# Patient Record
Sex: FEMALE | Race: WHITE | Employment: OTHER | URBAN - NONMETROPOLITAN AREA
[De-identification: names, ages, dates, MRNs, and addresses within clinical notes are randomized per-mention and may not be internally consistent; named-entity substitution may affect disease eponyms.]

---

## 2017-12-20 ENCOUNTER — DOCTOR'S OFFICE (OUTPATIENT)
Dept: URBAN - NONMETROPOLITAN AREA CLINIC 1 | Facility: CLINIC | Age: 72
Setting detail: OPHTHALMOLOGY
End: 2017-12-20
Payer: COMMERCIAL

## 2017-12-20 ENCOUNTER — RX ONLY (RX ONLY)
Age: 72
End: 2017-12-20

## 2017-12-20 DIAGNOSIS — H04.123: ICD-10-CM

## 2017-12-20 DIAGNOSIS — H35.033: ICD-10-CM

## 2017-12-20 DIAGNOSIS — Z96.1: ICD-10-CM

## 2017-12-20 DIAGNOSIS — H40.003: ICD-10-CM

## 2017-12-20 DIAGNOSIS — H04.121: ICD-10-CM

## 2017-12-20 DIAGNOSIS — H04.122: ICD-10-CM

## 2017-12-20 PROCEDURE — 92250 FUNDUS PHOTOGRAPHY W/I&R: CPT | Performed by: OPHTHALMOLOGY

## 2017-12-20 PROCEDURE — 92014 COMPRE OPH EXAM EST PT 1/>: CPT | Performed by: OPHTHALMOLOGY

## 2017-12-20 PROCEDURE — 83861 MICROFLUID ANALY TEARS: CPT | Performed by: OPHTHALMOLOGY

## 2017-12-20 ASSESSMENT — REFRACTION_MANIFEST
OD_VA1: 20/25-2
OU_VA: 20/
OD_VA2: 20/
OD_VA1: 20/
OU_VA: 20/
OD_VA2: 20/25-2
OS_ADD: +2.25
OS_VA1: 20/
OS_VA2: 20/
OS_VA1: 20/25-2
OS_VA3: 20/
OD_VA3: 20/
OS_VA2: 20/25-2
OD_VA3: 20/
OS_SPHERE: +0.75
OS_CYLINDER: -0.50
OS_VA3: 20/
OD_VA2: 20/
OD_ADD: +2.25
OS_AXIS: 090
OS_VA2: 20/
OD_VA1: 20/
OD_AXIS: 110
OU_VA: 20/
OS_VA3: 20/
OD_CYLINDER: -0.25
OD_SPHERE: +0.75
OD_VA3: 20/
OS_VA1: 20/

## 2017-12-20 ASSESSMENT — DRY EYES - PHYSICIAN NOTES
OD_GENERALCOMMENTS: K SICCA
OS_GENERALCOMMENTS: K SICCA

## 2017-12-20 ASSESSMENT — REFRACTION_CURRENTRX
OS_OVR_VA: 20/
OS_OVR_VA: 20/
OS_SPHERE: +2.50
OS_OVR_VA: 20/
OD_CYLINDER: SPH
OD_SPHERE: +2.50
OS_AXIS: 125
OD_ADD: +2.25
OS_ADD: +2.25
OD_OVR_VA: 20/
OD_VPRISM_DIRECTION: PROGS
OD_OVR_VA: 20/
OS_CYLINDER: -0.25
OS_SPHERE: +0.50
OS_VPRISM_DIRECTION: PROGS
OD_SPHERE: +0.25
OD_OVR_VA: 20/

## 2017-12-20 ASSESSMENT — REFRACTION_AUTOREFRACTION
OS_CYLINDER: -0.50
OD_AXIS: 115
OS_AXIS: 080
OS_SPHERE: +1.00
OD_SPHERE: +1.25
OD_CYLINDER: -0.50

## 2017-12-20 ASSESSMENT — CONFRONTATIONAL VISUAL FIELD TEST (CVF)
OD_FINDINGS: FULL
OS_FINDINGS: FULL

## 2017-12-20 ASSESSMENT — SPHEQUIV_DERIVED
OD_SPHEQUIV: 0.625
OS_SPHEQUIV: 0.5
OS_SPHEQUIV: 0.75
OD_SPHEQUIV: 1

## 2017-12-20 ASSESSMENT — VISUAL ACUITY
OD_BCVA: 20/25-1
OS_BCVA: 20/25

## 2018-08-01 ENCOUNTER — DOCTOR'S OFFICE (OUTPATIENT)
Dept: URBAN - NONMETROPOLITAN AREA CLINIC 1 | Facility: CLINIC | Age: 73
Setting detail: OPHTHALMOLOGY
End: 2018-08-01
Payer: COMMERCIAL

## 2018-08-01 ENCOUNTER — RX ONLY (RX ONLY)
Age: 73
End: 2018-08-01

## 2018-08-01 DIAGNOSIS — Z96.1: ICD-10-CM

## 2018-08-01 DIAGNOSIS — H35.033: ICD-10-CM

## 2018-08-01 DIAGNOSIS — H04.121: ICD-10-CM

## 2018-08-01 DIAGNOSIS — H04.122: ICD-10-CM

## 2018-08-01 DIAGNOSIS — H40.013: ICD-10-CM

## 2018-08-01 DIAGNOSIS — H04.123: ICD-10-CM

## 2018-08-01 PROCEDURE — 83861 MICROFLUID ANALY TEARS: CPT | Performed by: OPHTHALMOLOGY

## 2018-08-01 PROCEDURE — 92014 COMPRE OPH EXAM EST PT 1/>: CPT | Performed by: OPHTHALMOLOGY

## 2018-08-01 PROCEDURE — 76514 ECHO EXAM OF EYE THICKNESS: CPT | Performed by: OPHTHALMOLOGY

## 2018-08-01 PROCEDURE — 92133 CPTRZD OPH DX IMG PST SGM ON: CPT | Performed by: OPHTHALMOLOGY

## 2018-08-01 ASSESSMENT — REFRACTION_CURRENTRX
OD_ADD: +2.25
OS_CYLINDER: -0.25
OD_SPHERE: +0.25
OD_OVR_VA: 20/
OS_VPRISM_DIRECTION: PROGS
OS_OVR_VA: 20/
OS_ADD: +2.25
OS_AXIS: 125
OS_SPHERE: +0.50
OS_OVR_VA: 20/
OD_SPHERE: +2.50
OD_OVR_VA: 20/
OS_OVR_VA: 20/
OD_VPRISM_DIRECTION: PROGS
OD_OVR_VA: 20/
OS_SPHERE: +2.50
OD_CYLINDER: SPH

## 2018-08-01 ASSESSMENT — REFRACTION_MANIFEST
OS_VA1: 20/
OS_VA1: 20/25-2
OS_VA2: 20/
OD_VA2: 20/
OD_VA3: 20/
OS_VA3: 20/
OS_AXIS: 090
OD_CYLINDER: -0.25
OU_VA: 20/
OD_ADD: +2.25
OS_CYLINDER: -0.50
OS_VA2: 20/25-2
OS_ADD: +2.25
OS_VA3: 20/
OD_VA3: 20/
OS_VA1: 20/
OS_VA3: 20/
OD_VA2: 20/25-2
OD_VA1: 20/
OD_AXIS: 110
OS_SPHERE: +0.75
OD_VA1: 20/25-2
OS_VA2: 20/
OD_VA1: 20/
OD_VA3: 20/
OD_VA2: 20/
OU_VA: 20/
OD_SPHERE: +0.75
OU_VA: 20/

## 2018-08-01 ASSESSMENT — PACHYMETRY
OD_CT_UM: 553
OD_CT_CORRECTION: -1
OS_CT_CORRECTION: -1
OS_CT_UM: 555

## 2018-08-01 ASSESSMENT — VISUAL ACUITY
OD_BCVA: 20/25-2
OS_BCVA: 20/25-1

## 2018-08-01 ASSESSMENT — REFRACTION_AUTOREFRACTION
OD_SPHERE: +1.25
OD_AXIS: 115
OS_AXIS: 080
OD_CYLINDER: -0.50
OS_SPHERE: +1.00
OS_CYLINDER: -0.50

## 2018-08-01 ASSESSMENT — SPHEQUIV_DERIVED
OD_SPHEQUIV: 1
OS_SPHEQUIV: 0.75
OS_SPHEQUIV: 0.5
OD_SPHEQUIV: 0.625

## 2018-08-01 ASSESSMENT — DRY EYES - PHYSICIAN NOTES
OS_GENERALCOMMENTS: K SICCA
OD_GENERALCOMMENTS: K SICCA

## 2018-08-01 ASSESSMENT — CONFRONTATIONAL VISUAL FIELD TEST (CVF)
OD_FINDINGS: FULL
OS_FINDINGS: FULL

## 2019-02-05 ENCOUNTER — DOCTOR'S OFFICE (OUTPATIENT)
Dept: URBAN - NONMETROPOLITAN AREA CLINIC 1 | Facility: CLINIC | Age: 74
Setting detail: OPHTHALMOLOGY
End: 2019-02-05
Payer: COMMERCIAL

## 2019-02-05 DIAGNOSIS — Z96.1: ICD-10-CM

## 2019-02-05 DIAGNOSIS — H04.121: ICD-10-CM

## 2019-02-05 DIAGNOSIS — H40.013: ICD-10-CM

## 2019-02-05 DIAGNOSIS — H35.033: ICD-10-CM

## 2019-02-05 DIAGNOSIS — H04.122: ICD-10-CM

## 2019-02-05 PROCEDURE — 92014 COMPRE OPH EXAM EST PT 1/>: CPT | Performed by: OPHTHALMOLOGY

## 2019-02-05 PROCEDURE — 92083 EXTENDED VISUAL FIELD XM: CPT | Performed by: OPHTHALMOLOGY

## 2019-02-05 ASSESSMENT — REFRACTION_CURRENTRX
OS_CYLINDER: -0.25
OD_ADD: +2.25
OS_AXIS: 125
OS_OVR_VA: 20/
OD_OVR_VA: 20/
OS_SPHERE: +0.50
OS_OVR_VA: 20/
OD_SPHERE: +2.50
OD_OVR_VA: 20/
OD_CYLINDER: SPH
OS_ADD: +2.25
OS_OVR_VA: 20/
OS_SPHERE: +2.50
OD_OVR_VA: 20/
OD_SPHERE: +0.25

## 2019-02-05 ASSESSMENT — REFRACTION_MANIFEST
OD_AXIS: 110
OS_ADD: +2.25
OS_VA3: 20/
OS_VA3: 20/
OS_VA1: 20/25-2
OS_AXIS: 090
OS_VA2: 20/25-2
OD_VA3: 20/
OS_VA2: 20/
OU_VA: 20/
OS_CYLINDER: -0.50
OD_ADD: +2.25
OD_VA2: 20/
OD_VA1: 20/
OD_SPHERE: +0.75
OD_CYLINDER: -0.25
OD_VA3: 20/
OS_SPHERE: +0.75
OD_VA1: 20/25-2
OD_VA2: 20/25-2
OU_VA: 20/
OS_VA1: 20/

## 2019-02-05 ASSESSMENT — VISUAL ACUITY
OS_BCVA: 20/25-1
OD_BCVA: 20/25-1

## 2019-02-05 ASSESSMENT — SPHEQUIV_DERIVED
OD_SPHEQUIV: 0.625
OS_SPHEQUIV: 0.5
OD_SPHEQUIV: 0.75
OS_SPHEQUIV: 0.75

## 2019-02-05 ASSESSMENT — DRY EYES - PHYSICIAN NOTES
OS_GENERALCOMMENTS: K SICCA
OD_GENERALCOMMENTS: K SICCA

## 2019-02-05 ASSESSMENT — REFRACTION_AUTOREFRACTION
OS_AXIS: 180
OD_AXIS: 100
OS_CYLINDER: 0.00
OD_CYLINDER: -1.00
OD_SPHERE: +1.25
OS_SPHERE: +0.75

## 2019-02-05 ASSESSMENT — CONFRONTATIONAL VISUAL FIELD TEST (CVF)
OS_FINDINGS: FULL
OD_FINDINGS: FULL

## 2019-08-13 ENCOUNTER — DOCTOR'S OFFICE (OUTPATIENT)
Dept: URBAN - NONMETROPOLITAN AREA CLINIC 1 | Facility: CLINIC | Age: 74
Setting detail: OPHTHALMOLOGY
End: 2019-08-13
Payer: COMMERCIAL

## 2019-08-13 DIAGNOSIS — H04.122: ICD-10-CM

## 2019-08-13 DIAGNOSIS — H04.121: ICD-10-CM

## 2019-08-13 DIAGNOSIS — H40.013: ICD-10-CM

## 2019-08-13 DIAGNOSIS — Z96.1: ICD-10-CM

## 2019-08-13 DIAGNOSIS — H04.123: ICD-10-CM

## 2019-08-13 DIAGNOSIS — H35.033: ICD-10-CM

## 2019-08-13 PROCEDURE — 83861 MICROFLUID ANALY TEARS: CPT | Performed by: OPHTHALMOLOGY

## 2019-08-13 PROCEDURE — 76514 ECHO EXAM OF EYE THICKNESS: CPT | Performed by: OPHTHALMOLOGY

## 2019-08-13 PROCEDURE — 92133 CPTRZD OPH DX IMG PST SGM ON: CPT | Performed by: OPHTHALMOLOGY

## 2019-08-13 PROCEDURE — 99215 OFFICE O/P EST HI 40 MIN: CPT | Performed by: OPHTHALMOLOGY

## 2019-08-13 ASSESSMENT — SPHEQUIV_DERIVED
OS_SPHEQUIV: 0.75
OD_SPHEQUIV: 0.625
OD_SPHEQUIV: 1.125
OS_SPHEQUIV: 0.5

## 2019-08-13 ASSESSMENT — REFRACTION_MANIFEST
OD_VA2: 20/25-2
OS_VA2: 20/25-2
OD_ADD: +2.25
OU_VA: 20/
OS_VA1: 20/25-2
OS_CYLINDER: -0.50
OD_VA3: 20/
OD_VA2: 20/
OS_VA1: 20/
OU_VA: 20/
OD_AXIS: 110
OS_VA3: 20/
OS_ADD: +2.25
OS_VA3: 20/
OS_SPHERE: +0.75
OS_AXIS: 090
OD_CYLINDER: -0.25
OS_VA2: 20/
OD_VA3: 20/
OD_SPHERE: +0.75
OD_VA1: 20/
OD_VA1: 20/25-2

## 2019-08-13 ASSESSMENT — PACHYMETRY
OD_CT_CORRECTION: -1
OD_CT_UM: 553
OS_CT_CORRECTION: -1
OS_CT_UM: 555

## 2019-08-13 ASSESSMENT — REFRACTION_CURRENTRX
OD_OVR_VA: 20/
OS_SPHERE: +2.50
OD_ADD: +2.25
OS_AXIS: 125
OS_CYLINDER: -0.25
OS_SPHERE: +0.50
OD_OVR_VA: 20/
OD_OVR_VA: 20/
OD_SPHERE: +0.25
OS_ADD: +2.25
OS_OVR_VA: 20/
OD_CYLINDER: SPH
OS_OVR_VA: 20/
OS_OVR_VA: 20/
OD_SPHERE: +2.50

## 2019-08-13 ASSESSMENT — REFRACTION_AUTOREFRACTION
OD_CYLINDER: -1.25
OS_AXIS: 158
OD_SPHERE: +1.75
OS_CYLINDER: -1.00
OS_SPHERE: +1.25
OD_AXIS: 111

## 2019-08-13 ASSESSMENT — DRY EYES - PHYSICIAN NOTES
OS_GENERALCOMMENTS: K SICCA
OD_GENERALCOMMENTS: K SICCA

## 2019-08-13 ASSESSMENT — CONFRONTATIONAL VISUAL FIELD TEST (CVF)
OD_FINDINGS: FULL
OS_FINDINGS: FULL

## 2019-08-13 ASSESSMENT — VISUAL ACUITY
OS_BCVA: 20/25-2
OD_BCVA: 20/30-2

## 2020-02-18 ENCOUNTER — RX ONLY (RX ONLY)
Age: 75
End: 2020-02-18

## 2020-02-18 ENCOUNTER — DOCTOR'S OFFICE (OUTPATIENT)
Dept: URBAN - NONMETROPOLITAN AREA CLINIC 1 | Facility: CLINIC | Age: 75
Setting detail: OPHTHALMOLOGY
End: 2020-02-18
Payer: COMMERCIAL

## 2020-02-18 DIAGNOSIS — H04.123: ICD-10-CM

## 2020-02-18 DIAGNOSIS — H40.013: ICD-10-CM

## 2020-02-18 DIAGNOSIS — Z96.1: ICD-10-CM

## 2020-02-18 DIAGNOSIS — H35.033: ICD-10-CM

## 2020-02-18 PROCEDURE — 92083 EXTENDED VISUAL FIELD XM: CPT | Performed by: OPHTHALMOLOGY

## 2020-02-18 PROCEDURE — 92134 CPTRZ OPH DX IMG PST SGM RTA: CPT | Performed by: OPHTHALMOLOGY

## 2020-02-18 PROCEDURE — 99214 OFFICE O/P EST MOD 30 MIN: CPT | Performed by: OPHTHALMOLOGY

## 2020-02-18 ASSESSMENT — REFRACTION_CURRENTRX
OD_SPHERE: +0.25
OS_OVR_VA: 20/
OS_CYLINDER: -0.25
OD_OVR_VA: 20/
OS_AXIS: 125
OD_OVR_VA: 20/
OS_ADD: +2.25
OS_SPHERE: +2.50
OD_ADD: +2.25
OD_SPHERE: +2.50
OS_OVR_VA: 20/
OS_SPHERE: +0.50
OD_CYLINDER: SPH

## 2020-02-18 ASSESSMENT — REFRACTION_MANIFEST
OS_AXIS: 090
OS_SPHERE: +0.75
OD_VA2: 20/25-2
OS_CYLINDER: -0.50
OD_AXIS: 110
OS_VA2: 20/25-2
OD_SPHERE: +0.75
OS_ADD: +2.25
OD_ADD: +2.25
OD_VA1: 20/25-2
OS_VA1: 20/25-2
OD_CYLINDER: -0.25

## 2020-02-18 ASSESSMENT — SPHEQUIV_DERIVED
OD_SPHEQUIV: 0.625
OS_SPHEQUIV: 0.5
OS_SPHEQUIV: 0.375
OD_SPHEQUIV: 1

## 2020-02-18 ASSESSMENT — CONFRONTATIONAL VISUAL FIELD TEST (CVF)
OS_FINDINGS: FULL
OD_FINDINGS: FULL

## 2020-02-18 ASSESSMENT — REFRACTION_AUTOREFRACTION
OS_CYLINDER: -0.75
OD_AXIS: 107
OD_SPHERE: +1.50
OD_CYLINDER: -1.00
OS_SPHERE: +0.75
OS_AXIS: 099

## 2020-02-18 ASSESSMENT — VISUAL ACUITY
OD_BCVA: 20/30-2
OS_BCVA: 20/25-2

## 2020-02-18 ASSESSMENT — DRY EYES - PHYSICIAN NOTES
OS_GENERALCOMMENTS: K SICCA
OD_GENERALCOMMENTS: K SICCA

## 2020-05-27 ENCOUNTER — DOCTOR'S OFFICE (OUTPATIENT)
Dept: URBAN - NONMETROPOLITAN AREA CLINIC 1 | Facility: CLINIC | Age: 75
Setting detail: OPHTHALMOLOGY
End: 2020-05-27
Payer: COMMERCIAL

## 2020-05-27 ENCOUNTER — RX ONLY (RX ONLY)
Age: 75
End: 2020-05-27

## 2020-05-27 DIAGNOSIS — H40.1130: ICD-10-CM

## 2020-05-27 DIAGNOSIS — H35.033: ICD-10-CM

## 2020-05-27 DIAGNOSIS — H04.123: ICD-10-CM

## 2020-05-27 DIAGNOSIS — Z96.1: ICD-10-CM

## 2020-05-27 PROCEDURE — 99214 OFFICE O/P EST MOD 30 MIN: CPT | Performed by: OPHTHALMOLOGY

## 2020-05-27 PROCEDURE — 92250 FUNDUS PHOTOGRAPHY W/I&R: CPT | Performed by: OPHTHALMOLOGY

## 2020-05-27 PROCEDURE — 92133 CPTRZD OPH DX IMG PST SGM ON: CPT | Performed by: OPHTHALMOLOGY

## 2020-05-27 ASSESSMENT — REFRACTION_CURRENTRX
OD_SPHERE: +0.25
OD_OVR_VA: 20/
OS_CYLINDER: -0.25
OS_OVR_VA: 20/
OS_OVR_VA: 20/
OD_SPHERE: +2.50
OS_SPHERE: +0.50
OS_ADD: +2.25
OS_SPHERE: +2.50
OD_OVR_VA: 20/
OS_AXIS: 125
OD_ADD: +2.25
OD_CYLINDER: SPH

## 2020-05-27 ASSESSMENT — REFRACTION_AUTOREFRACTION
OD_SPHERE: +1.50
OD_CYLINDER: -1.00
OS_CYLINDER: -0.75
OS_SPHERE: +0.75
OD_AXIS: 107
OS_AXIS: 099

## 2020-05-27 ASSESSMENT — REFRACTION_MANIFEST
OD_VA2: 20/25-2
OS_AXIS: 090
OS_CYLINDER: -0.50
OS_VA2: 20/25-2
OD_AXIS: 110
OD_ADD: +2.25
OS_SPHERE: +0.75
OD_SPHERE: +0.75
OD_CYLINDER: -0.25
OD_VA1: 20/25-2
OS_VA1: 20/25-2
OS_ADD: +2.25

## 2020-05-27 ASSESSMENT — CONFRONTATIONAL VISUAL FIELD TEST (CVF)
OS_FINDINGS: FULL
OD_FINDINGS: FULL

## 2020-05-27 ASSESSMENT — VISUAL ACUITY
OD_BCVA: 20/25-2
OS_BCVA: 20/25-1

## 2020-05-27 ASSESSMENT — SPHEQUIV_DERIVED
OS_SPHEQUIV: 0.375
OS_SPHEQUIV: 0.5
OD_SPHEQUIV: 1
OD_SPHEQUIV: 0.625

## 2020-05-27 ASSESSMENT — DRY EYES - PHYSICIAN NOTES
OD_GENERALCOMMENTS: K SICCA
OS_GENERALCOMMENTS: K SICCA

## 2020-12-01 ENCOUNTER — RX ONLY (RX ONLY)
Age: 75
End: 2020-12-01

## 2020-12-01 ENCOUNTER — DOCTOR'S OFFICE (OUTPATIENT)
Dept: URBAN - NONMETROPOLITAN AREA CLINIC 1 | Facility: CLINIC | Age: 75
Setting detail: OPHTHALMOLOGY
End: 2020-12-01
Payer: COMMERCIAL

## 2020-12-01 VITALS — HEIGHT: 55 IN

## 2020-12-01 DIAGNOSIS — H35.373: ICD-10-CM

## 2020-12-01 DIAGNOSIS — Z96.1: ICD-10-CM

## 2020-12-01 DIAGNOSIS — H04.122: ICD-10-CM

## 2020-12-01 DIAGNOSIS — H04.121: ICD-10-CM

## 2020-12-01 DIAGNOSIS — H35.033: ICD-10-CM

## 2020-12-01 DIAGNOSIS — H40.1130: ICD-10-CM

## 2020-12-01 PROCEDURE — 92134 CPTRZ OPH DX IMG PST SGM RTA: CPT | Performed by: OPHTHALMOLOGY

## 2020-12-01 PROCEDURE — 92083 EXTENDED VISUAL FIELD XM: CPT | Performed by: OPHTHALMOLOGY

## 2020-12-01 PROCEDURE — 92014 COMPRE OPH EXAM EST PT 1/>: CPT | Performed by: OPHTHALMOLOGY

## 2020-12-01 PROCEDURE — 76514 ECHO EXAM OF EYE THICKNESS: CPT | Performed by: OPHTHALMOLOGY

## 2020-12-01 ASSESSMENT — REFRACTION_MANIFEST
OS_SPHERE: +0.75
OD_CYLINDER: -0.25
OS_CYLINDER: -0.50
OD_AXIS: 110
OS_AXIS: 090
OD_VA2: 20/25-2
OD_ADD: +2.25
OD_SPHERE: +0.75
OS_VA1: 20/25-2
OS_ADD: +2.25
OS_VA2: 20/25-2
OD_VA1: 20/25-2

## 2020-12-01 ASSESSMENT — PACHYMETRY
OD_CT_UM: 553
OS_CT_UM: 555
OD_CT_CORRECTION: -1
OS_CT_CORRECTION: -1

## 2020-12-01 ASSESSMENT — REFRACTION_AUTOREFRACTION
OS_SPHERE: +0.75
OS_AXIS: 099
OS_CYLINDER: -0.75
OD_AXIS: 107
OD_SPHERE: +1.50
OD_CYLINDER: -1.00

## 2020-12-01 ASSESSMENT — REFRACTION_CURRENTRX
OS_ADD: +2.25
OD_SPHERE: +2.50
OD_CYLINDER: SPH
OS_SPHERE: +0.50
OD_OVR_VA: 20/
OD_OVR_VA: 20/
OS_CYLINDER: -0.25
OD_SPHERE: +0.25
OS_OVR_VA: 20/
OS_OVR_VA: 20/
OS_SPHERE: +2.50
OS_AXIS: 125
OD_ADD: +2.25

## 2020-12-01 ASSESSMENT — CONFRONTATIONAL VISUAL FIELD TEST (CVF)
OS_FINDINGS: FULL
OD_FINDINGS: FULL

## 2020-12-01 ASSESSMENT — TONOMETRY: OS_IOP_MMHG: 17

## 2020-12-01 ASSESSMENT — SPHEQUIV_DERIVED
OD_SPHEQUIV: 1
OS_SPHEQUIV: 0.5
OS_SPHEQUIV: 0.375
OD_SPHEQUIV: 0.625

## 2020-12-01 ASSESSMENT — DRY EYES - PHYSICIAN NOTES
OS_GENERALCOMMENTS: K SICCA
OD_GENERALCOMMENTS: K SICCA

## 2020-12-01 ASSESSMENT — VISUAL ACUITY
OD_BCVA: 20/25
OS_BCVA: 20/25-2

## 2020-12-29 ENCOUNTER — AMBUL SURGICAL CARE (OUTPATIENT)
Dept: URBAN - NONMETROPOLITAN AREA SURGERY 1 | Facility: SURGERY | Age: 75
Setting detail: OPHTHALMOLOGY
End: 2020-12-29
Payer: COMMERCIAL

## 2020-12-29 DIAGNOSIS — H40.1110: ICD-10-CM

## 2020-12-29 PROCEDURE — G8918 PT W/O PREOP ORDER IV AB PRO: HCPCS | Performed by: CLINIC/CENTER

## 2020-12-29 PROCEDURE — 65855 TRABECULOPLASTY LASER SURG: CPT | Performed by: OPHTHALMOLOGY

## 2020-12-29 PROCEDURE — 65855 TRABECULOPLASTY LASER SURG: CPT | Performed by: CLINIC/CENTER

## 2020-12-29 PROCEDURE — G8918 PT W/O PREOP ORDER IV AB PRO: HCPCS | Performed by: OPHTHALMOLOGY

## 2020-12-29 PROCEDURE — G8907 PT DOC NO EVENTS ON DISCHARG: HCPCS | Performed by: CLINIC/CENTER

## 2020-12-29 PROCEDURE — G8907 PT DOC NO EVENTS ON DISCHARG: HCPCS | Performed by: OPHTHALMOLOGY

## 2021-01-05 ENCOUNTER — AMBUL SURGICAL CARE (OUTPATIENT)
Dept: URBAN - NONMETROPOLITAN AREA SURGERY 1 | Facility: SURGERY | Age: 76
Setting detail: OPHTHALMOLOGY
End: 2021-01-05
Payer: COMMERCIAL

## 2021-01-05 DIAGNOSIS — H40.1120: ICD-10-CM

## 2021-01-05 PROCEDURE — G8907 PT DOC NO EVENTS ON DISCHARG: HCPCS | Performed by: OPHTHALMOLOGY

## 2021-01-05 PROCEDURE — G8918 PT W/O PREOP ORDER IV AB PRO: HCPCS | Performed by: OPHTHALMOLOGY

## 2021-01-05 PROCEDURE — 65855 TRABECULOPLASTY LASER SURG: CPT | Performed by: CLINIC/CENTER

## 2021-01-05 PROCEDURE — 65855 TRABECULOPLASTY LASER SURG: CPT | Performed by: OPHTHALMOLOGY

## 2021-01-05 PROCEDURE — G8907 PT DOC NO EVENTS ON DISCHARG: HCPCS | Performed by: CLINIC/CENTER

## 2021-01-05 PROCEDURE — G8918 PT W/O PREOP ORDER IV AB PRO: HCPCS | Performed by: CLINIC/CENTER

## 2021-02-16 ENCOUNTER — DOCTOR'S OFFICE (OUTPATIENT)
Dept: URBAN - NONMETROPOLITAN AREA CLINIC 1 | Facility: CLINIC | Age: 76
Setting detail: OPHTHALMOLOGY
End: 2021-02-16
Payer: COMMERCIAL

## 2021-02-16 VITALS — HEIGHT: 55 IN

## 2021-02-16 DIAGNOSIS — H04.121: ICD-10-CM

## 2021-02-16 DIAGNOSIS — H04.122: ICD-10-CM

## 2021-02-16 DIAGNOSIS — H35.033: ICD-10-CM

## 2021-02-16 DIAGNOSIS — H40.1130: ICD-10-CM

## 2021-02-16 DIAGNOSIS — Z96.1: ICD-10-CM

## 2021-02-16 DIAGNOSIS — H35.373: ICD-10-CM

## 2021-02-16 PROCEDURE — 92014 COMPRE OPH EXAM EST PT 1/>: CPT | Performed by: OPHTHALMOLOGY

## 2021-02-16 ASSESSMENT — REFRACTION_MANIFEST
OS_SPHERE: +0.75
OS_CYLINDER: -0.50
OS_VA2: 20/25-2
OD_ADD: +2.25
OD_VA1: 20/25-2
OD_CYLINDER: -0.25
OD_VA2: 20/25-2
OS_VA1: 20/25-2
OS_AXIS: 090
OS_ADD: +2.25
OD_AXIS: 110
OD_SPHERE: +0.75

## 2021-02-16 ASSESSMENT — CONFRONTATIONAL VISUAL FIELD TEST (CVF)
OD_FINDINGS: FULL
OS_FINDINGS: FULL

## 2021-02-16 ASSESSMENT — DRY EYES - PHYSICIAN NOTES
OS_GENERALCOMMENTS: K SICCA
OD_GENERALCOMMENTS: K SICCA

## 2021-02-16 ASSESSMENT — REFRACTION_CURRENTRX
OD_SPHERE: +2.50
OS_OVR_VA: 20/
OD_CYLINDER: SPH
OD_OVR_VA: 20/
OS_SPHERE: +2.50
OD_ADD: +2.25
OS_OVR_VA: 20/
OS_AXIS: 125
OD_SPHERE: +0.25
OD_OVR_VA: 20/
OS_SPHERE: +0.50
OS_CYLINDER: -0.25
OS_ADD: +2.25

## 2021-02-16 ASSESSMENT — TONOMETRY
OD_IOP_MMHG: 17
OS_IOP_MMHG: 16

## 2021-02-16 ASSESSMENT — SPHEQUIV_DERIVED
OD_SPHEQUIV: 1
OD_SPHEQUIV: 0.625
OS_SPHEQUIV: 0.375
OS_SPHEQUIV: 0.5

## 2021-02-16 ASSESSMENT — PACHYMETRY
OD_CT_CORRECTION: -1
OD_CT_UM: 553
OS_CT_UM: 555
OS_CT_CORRECTION: -1

## 2021-02-16 ASSESSMENT — REFRACTION_AUTOREFRACTION
OS_SPHERE: +0.75
OD_AXIS: 107
OD_SPHERE: +1.50
OD_CYLINDER: -1.00
OS_CYLINDER: -0.75
OS_AXIS: 099

## 2021-02-16 ASSESSMENT — VISUAL ACUITY
OS_BCVA: 20/25-2
OD_BCVA: 20/25-1

## 2021-05-19 ENCOUNTER — DOCTOR'S OFFICE (OUTPATIENT)
Dept: URBAN - NONMETROPOLITAN AREA CLINIC 1 | Facility: CLINIC | Age: 76
Setting detail: OPHTHALMOLOGY
End: 2021-05-19
Payer: COMMERCIAL

## 2021-05-19 DIAGNOSIS — H35.033: ICD-10-CM

## 2021-05-19 DIAGNOSIS — H04.122: ICD-10-CM

## 2021-05-19 DIAGNOSIS — H04.121: ICD-10-CM

## 2021-05-19 DIAGNOSIS — H35.373: ICD-10-CM

## 2021-05-19 DIAGNOSIS — H40.1130: ICD-10-CM

## 2021-05-19 DIAGNOSIS — Z96.1: ICD-10-CM

## 2021-05-19 PROCEDURE — 92083 EXTENDED VISUAL FIELD XM: CPT | Performed by: OPHTHALMOLOGY

## 2021-05-19 PROCEDURE — 92134 CPTRZ OPH DX IMG PST SGM RTA: CPT | Performed by: OPHTHALMOLOGY

## 2021-05-19 PROCEDURE — 92250 FUNDUS PHOTOGRAPHY W/I&R: CPT | Performed by: OPHTHALMOLOGY

## 2021-05-19 PROCEDURE — 92014 COMPRE OPH EXAM EST PT 1/>: CPT | Performed by: OPHTHALMOLOGY

## 2021-05-19 ASSESSMENT — REFRACTION_AUTOREFRACTION
OS_AXIS: 180
OS_SPHERE: +0.75
OD_AXIS: 020
OD_SPHERE: +0.50
OS_CYLINDER: 0.00
OD_CYLINDER: -0.75

## 2021-05-19 ASSESSMENT — REFRACTION_CURRENTRX
OS_ADD: +2.25
OS_CYLINDER: -0.25
OD_OVR_VA: 20/
OD_CYLINDER: SPH
OD_SPHERE: +2.50
OD_OVR_VA: 20/
OS_SPHERE: +0.50
OS_SPHERE: +2.50
OS_OVR_VA: 20/
OD_SPHERE: +0.25
OD_ADD: +2.25
OS_AXIS: 125
OS_OVR_VA: 20/

## 2021-05-19 ASSESSMENT — CONFRONTATIONAL VISUAL FIELD TEST (CVF)
OS_FINDINGS: FULL
OD_FINDINGS: FULL

## 2021-05-19 ASSESSMENT — VISUAL ACUITY
OS_BCVA: 20/25-2
OD_BCVA: 20/25-1

## 2021-05-19 ASSESSMENT — MACULA - EPIRETINAL MEMBRANE (ERM)
OS_ERM: 1+
OD_ERM: 1+

## 2021-05-19 ASSESSMENT — SPHEQUIV_DERIVED
OS_SPHEQUIV: 0.5
OS_SPHEQUIV: 0.75
OD_SPHEQUIV: 0.125
OD_SPHEQUIV: 0.625

## 2021-05-19 ASSESSMENT — REFRACTION_MANIFEST
OD_SPHERE: +0.75
OD_VA1: 20/25-2
OD_AXIS: 110
OS_VA2: 20/25-2
OS_SPHERE: +0.75
OD_ADD: +2.25
OS_ADD: +2.25
OD_CYLINDER: -0.25
OS_VA1: 20/25-2
OS_AXIS: 090
OS_CYLINDER: -0.50
OD_VA2: 20/25-2

## 2021-05-19 ASSESSMENT — PACHYMETRY
OD_CT_UM: 553
OS_CT_CORRECTION: -1
OS_CT_UM: 555
OD_CT_CORRECTION: -1

## 2021-05-19 ASSESSMENT — TONOMETRY
OD_IOP_MMHG: 16
OS_IOP_MMHG: 16

## 2021-08-29 ENCOUNTER — HOSPITAL ENCOUNTER (EMERGENCY)
Facility: HOSPITAL | Age: 76
Discharge: HOME | End: 2021-08-29
Attending: EMERGENCY MEDICINE
Payer: MEDICARE

## 2021-08-29 VITALS
HEIGHT: 67 IN | BODY MASS INDEX: 33.56 KG/M2 | DIASTOLIC BLOOD PRESSURE: 73 MMHG | SYSTOLIC BLOOD PRESSURE: 172 MMHG | WEIGHT: 213.85 LBS | TEMPERATURE: 97 F | HEART RATE: 76 BPM | OXYGEN SATURATION: 95 % | RESPIRATION RATE: 18 BRPM

## 2021-08-29 DIAGNOSIS — Z20.822 ENCOUNTER FOR LABORATORY TESTING FOR COVID-19 VIRUS: Primary | ICD-10-CM

## 2021-08-29 LAB — SARS-COV-2 RNA RESP QL NAA+PROBE: NEGATIVE

## 2021-08-29 PROCEDURE — 99283 EMERGENCY DEPT VISIT LOW MDM: CPT

## 2021-08-29 PROCEDURE — 99284 EMERGENCY DEPT VISIT MOD MDM: CPT | Performed by: EMERGENCY MEDICINE

## 2021-08-29 PROCEDURE — U0003 INFECTIOUS AGENT DETECTION BY NUCLEIC ACID (DNA OR RNA); SEVERE ACUTE RESPIRATORY SYNDROME CORONAVIRUS 2 (SARS-COV-2) (CORONAVIRUS DISEASE [COVID-19]), AMPLIFIED PROBE TECHNIQUE, MAKING USE OF HIGH THROUGHPUT TECHNOLOGIES AS DESCRIBED BY CMS-2020-01-R: HCPCS | Performed by: PHYSICIAN ASSISTANT

## 2021-08-29 PROCEDURE — U0005 INFEC AGEN DETEC AMPLI PROBE: HCPCS | Performed by: PHYSICIAN ASSISTANT

## 2021-08-29 RX ORDER — FEXOFENADINE HCL 180 MG/1
180 TABLET ORAL
COMMUNITY

## 2021-08-29 RX ORDER — ASPIRIN 81 MG/1
81 TABLET ORAL
COMMUNITY

## 2021-08-29 RX ORDER — LISINOPRIL AND HYDROCHLOROTHIAZIDE 20; 12.5 MG/1; MG/1
1 TABLET ORAL
COMMUNITY

## 2021-08-29 RX ORDER — AZELASTINE 1 MG/ML
SPRAY, METERED NASAL
COMMUNITY

## 2021-08-29 RX ORDER — CALCIUM CARBONATE/VITAMIN D3 500 MG-10
1 TABLET ORAL
COMMUNITY

## 2021-08-29 RX ORDER — MONTELUKAST SODIUM 10 MG/1
10 TABLET ORAL
COMMUNITY

## 2021-08-29 NOTE — ED PROVIDER NOTES
History  Chief Complaint   Patient presents with    Flu Symptoms     pt arrives reporting that her  recently tested positive for covid and she would like to be checked  pt is not having any symptoms  81-year-old female presents the emergency department requesting COVID test   Patient states her  recently tested COVID positive and she was told due to age she should also be checked  She denies any symptoms at this point  She denies chest pain, palpitations, shortness of breath  She denies any cough, congestion, fevers, chills  She denies abdominal pain, nausea, vomiting, diarrhea  No urinary symptoms  Patient is a nonsmoker  History of asthma  History provided by:  Patient      Prior to Admission Medications   Prescriptions Last Dose Informant Patient Reported? Taking? Calcium Carb-Cholecalciferol (Oyster Shell Calcium) 500-400 MG-UNIT TABS   Yes Yes   Sig: Take 1 tablet by mouth   aspirin (ECOTRIN LOW STRENGTH) 81 mg EC tablet   Yes Yes   Sig: Take 81 mg by mouth   azelastine (ASTELIN) 0 1 % nasal spray   Yes Yes   fexofenadine (ALLEGRA) 180 MG tablet   Yes Yes   Sig: Take 180 mg by mouth   fluticasone (VERAMYST) 27 5 MCG/SPRAY nasal spray   Yes Yes   Si sprays into each nostril daily   lisinopril-hydrochlorothiazide (PRINZIDE,ZESTORETIC) 20-12 5 MG per tablet   Yes Yes   Sig: Take 1 tablet by mouth   montelukast (SINGULAIR) 10 mg tablet   Yes Yes   Sig: Take 10 mg by mouth      Facility-Administered Medications: None       Past Medical History:   Diagnosis Date    Asthma     Hypertension        Past Surgical History:   Procedure Laterality Date    ADENOIDECTOMY      CATARACT EXTRACTION      HYSTERECTOMY      TONSILLECTOMY         History reviewed  No pertinent family history  I have reviewed and agree with the history as documented      E-Cigarette/Vaping     E-Cigarette/Vaping Substances     Social History     Tobacco Use    Smoking status: Never Smoker    Smokeless tobacco: Never Used   Substance Use Topics    Alcohol use: Never    Drug use: Never       Review of Systems   Constitutional: Negative  HENT: Negative  Respiratory: Negative  Cardiovascular: Negative  Gastrointestinal: Negative  Genitourinary: Negative  Musculoskeletal: Negative  Skin: Negative  Neurological: Negative  All other systems reviewed and are negative  Physical Exam  Physical Exam  Vitals and nursing note reviewed  Constitutional:       General: She is not in acute distress  Appearance: Normal appearance  She is normal weight  She is not ill-appearing, toxic-appearing or diaphoretic  HENT:      Head: Normocephalic and atraumatic  Nose: Nose normal       Mouth/Throat:      Mouth: Mucous membranes are moist       Pharynx: Oropharynx is clear  Eyes:      Conjunctiva/sclera: Conjunctivae normal    Cardiovascular:      Rate and Rhythm: Normal rate and regular rhythm  Pulmonary:      Effort: Pulmonary effort is normal  No respiratory distress  Breath sounds: Normal breath sounds  No stridor  No wheezing, rhonchi or rales  Chest:      Chest wall: No tenderness  Abdominal:      General: Abdomen is flat  Bowel sounds are normal  There is no distension  Tenderness: There is no abdominal tenderness  There is no guarding  Musculoskeletal:         General: Normal range of motion  Cervical back: Normal range of motion  Skin:     General: Skin is warm and dry  Capillary Refill: Capillary refill takes less than 2 seconds  Coloration: Skin is not pale  Findings: No rash  Neurological:      General: No focal deficit present  Mental Status: She is alert and oriented to person, place, and time  Psychiatric:         Mood and Affect: Mood normal          Behavior: Behavior normal          Thought Content:  Thought content normal          Judgment: Judgment normal          Vital Signs  ED Triage Vitals [08/29/21 1627]   Temperature Pulse Respirations Blood Pressure SpO2   (!) 97 °F (36 1 °C) 78 18 (!) 188/86 96 %      Temp src Heart Rate Source Patient Position - Orthostatic VS BP Location FiO2 (%)   -- -- -- -- --      Pain Score       No Pain           Vitals:    08/29/21 1627 08/29/21 1630   BP: (!) 188/86 (!) 172/73   Pulse: 78 76         Visual Acuity      ED Medications  Medications - No data to display    Diagnostic Studies  Results Reviewed     Procedure Component Value Units Date/Time    Novel Coronavirus Jose Francisco Ryan [address] [facility] - 2 Hour Stat [[id number]] Collected: 08/29/21 1634    Lab Status: In process Specimen: Nares from Nasopharyngeal Swab Updated: 08/29/21 1636                 No orders to display              Procedures  Procedures         ED Course                             SBIRT 22yo+      Most Recent Value   SBIRT (25 yo +)   In order to provide better care to our patients, we are screening all of our patients for alcohol and drug use  Would it be okay to ask you these screening questions? No Filed at: 08/29/2021 1632                    MDM  Number of Diagnoses or Management Options  Encounter for laboratory testing for COVID-19 virus: new and requires workup  Diagnosis management comments: 68-year-old female presents to the emergency department requesting COVID test    recently tested COVID positive and she was instructed to get a test for herself  Patient is completely asymptomatic  Physical exam is unremarkable  I discussed COVID test with patient, will call with results  We discussed appropriate quarantine precautions  Patient was educated on symptoms that require prompt return to the emergency department for further evaluation and verbalized understanding           Amount and/or Complexity of Data Reviewed  Clinical lab tests: ordered  Review and summarize past medical records: yes        Disposition  Final diagnoses:   Encounter for laboratory testing for COVID-19 virus     Time reflects when diagnosis was documented in both MDM as applicable and the Disposition within this note     Time User Action Codes Description Comment    8/29/2021  4:34 PM Murray Mcdermott [[id number]] Encounter for laboratory testing for COVID-19 virus       ED Disposition     ED Disposition Condition Date/Time Comment    Discharge Stable Sun Aug 29, 2021  4:34 PM Yury Brain discharge to home/self care  Follow-up Information     Follow up With Specialties Details Why [address] [facility], [facility] [address]   [address]  [place] [address] [facility]  [phone]            Discharge Medication List as of 8/29/2021  4:34 PM      CONTINUE these medications which have NOT CHANGED    Details   aspirin (ECOTRIN LOW STRENGTH) 81 mg EC tablet Take 81 mg by mouth, Historical Med      azelastine (ASTELIN) 0 1 % nasal spray Historical Med      Calcium Carb-Cholecalciferol (Oyster Shell Calcium) 500-400 MG-UNIT TABS Take 1 tablet by mouth, Historical Med      fexofenadine (ALLEGRA) 180 MG tablet Take 180 mg by mouth, Historical Med      fluticasone (VERAMYST) 27 5 MCG/SPRAY nasal spray 2 sprays into each nostril daily, Historical Med      montelukast (SINGULAIR) 10 mg tablet Take 10 mg by mouth, Historical Med           No discharge procedures on file      PDMP Review     None          ED Provider  Electronically Signed by           Lorena Bernal PA-C  08/29/21 3946

## 2021-11-30 ENCOUNTER — DOCTOR'S OFFICE (OUTPATIENT)
Dept: URBAN - NONMETROPOLITAN AREA CLINIC 1 | Facility: CLINIC | Age: 76
Setting detail: OPHTHALMOLOGY
End: 2021-11-30
Payer: COMMERCIAL

## 2021-11-30 DIAGNOSIS — Z96.1: ICD-10-CM

## 2021-11-30 DIAGNOSIS — H35.373: ICD-10-CM

## 2021-11-30 DIAGNOSIS — H04.121: ICD-10-CM

## 2021-11-30 DIAGNOSIS — H40.1131: ICD-10-CM

## 2021-11-30 DIAGNOSIS — H04.122: ICD-10-CM

## 2021-11-30 DIAGNOSIS — H35.033: ICD-10-CM

## 2021-11-30 PROCEDURE — 92133 CPTRZD OPH DX IMG PST SGM ON: CPT | Performed by: OPHTHALMOLOGY

## 2021-11-30 PROCEDURE — 92014 COMPRE OPH EXAM EST PT 1/>: CPT | Performed by: OPHTHALMOLOGY

## 2021-11-30 ASSESSMENT — REFRACTION_AUTOREFRACTION
OD_CYLINDER: -0.75
OS_AXIS: 180
OS_CYLINDER: 0.00
OD_AXIS: 020
OD_SPHERE: +0.50
OS_SPHERE: +0.75

## 2021-11-30 ASSESSMENT — REFRACTION_MANIFEST
OS_VA1: 20/25-2
OS_VA2: 20/25-2
OS_SPHERE: +0.75
OD_VA1: 20/25-2
OD_CYLINDER: -0.25
OS_CYLINDER: -0.50
OS_AXIS: 090
OD_ADD: +2.25
OD_SPHERE: +0.75
OS_ADD: +2.25
OD_AXIS: 110
OD_VA2: 20/25-2

## 2021-11-30 ASSESSMENT — TONOMETRY
OS_IOP_MMHG: 16
OD_IOP_MMHG: 15

## 2021-11-30 ASSESSMENT — REFRACTION_CURRENTRX
OS_AXIS: 125
OD_CYLINDER: SPH
OS_OVR_VA: 20/
OD_OVR_VA: 20/
OD_SPHERE: +2.50
OS_OVR_VA: 20/
OD_OVR_VA: 20/
OS_SPHERE: +0.50
OD_SPHERE: +0.25
OS_CYLINDER: -0.25
OS_ADD: +2.25
OS_SPHERE: +2.50
OD_ADD: +2.25

## 2021-11-30 ASSESSMENT — PACHYMETRY
OS_CT_CORRECTION: -1
OD_CT_CORRECTION: -1
OD_CT_UM: 553
OS_CT_UM: 555

## 2021-11-30 ASSESSMENT — SPHEQUIV_DERIVED
OD_SPHEQUIV: 0.125
OD_SPHEQUIV: 0.625
OS_SPHEQUIV: 0.75
OS_SPHEQUIV: 0.5

## 2021-11-30 ASSESSMENT — VISUAL ACUITY
OS_BCVA: 20/25-2
OD_BCVA: 20/25

## 2021-11-30 ASSESSMENT — CONFRONTATIONAL VISUAL FIELD TEST (CVF)
OS_FINDINGS: FULL
OD_FINDINGS: FULL

## 2022-07-27 ENCOUNTER — DOCTOR'S OFFICE (OUTPATIENT)
Dept: URBAN - NONMETROPOLITAN AREA CLINIC 1 | Facility: CLINIC | Age: 77
Setting detail: OPHTHALMOLOGY
End: 2022-07-27
Payer: COMMERCIAL

## 2022-07-27 DIAGNOSIS — H04.122: ICD-10-CM

## 2022-07-27 DIAGNOSIS — Z96.1: ICD-10-CM

## 2022-07-27 DIAGNOSIS — H04.121: ICD-10-CM

## 2022-07-27 DIAGNOSIS — H40.1130: ICD-10-CM

## 2022-07-27 DIAGNOSIS — H35.033: ICD-10-CM

## 2022-07-27 DIAGNOSIS — H35.373: ICD-10-CM

## 2022-07-27 PROCEDURE — 92014 COMPRE OPH EXAM EST PT 1/>: CPT | Performed by: OPHTHALMOLOGY

## 2022-07-27 PROCEDURE — 92134 CPTRZ OPH DX IMG PST SGM RTA: CPT | Performed by: OPHTHALMOLOGY

## 2022-07-27 PROCEDURE — 76514 ECHO EXAM OF EYE THICKNESS: CPT | Performed by: OPHTHALMOLOGY

## 2022-07-27 PROCEDURE — 92083 EXTENDED VISUAL FIELD XM: CPT | Performed by: OPHTHALMOLOGY

## 2022-07-27 ASSESSMENT — PACHYMETRY
OD_CT_UM: 553
OS_CT_CORRECTION: -1
OS_CT_UM: 555
OD_CT_CORRECTION: -1

## 2022-07-27 ASSESSMENT — REFRACTION_AUTOREFRACTION
OS_AXIS: 180
OS_SPHERE: +0.75
OS_CYLINDER: 0.00
OD_CYLINDER: -0.75
OD_AXIS: 020
OD_SPHERE: +0.50

## 2022-07-27 ASSESSMENT — REFRACTION_CURRENTRX
OD_OVR_VA: 20/
OS_SPHERE: +2.50
OD_OVR_VA: 20/
OS_OVR_VA: 20/
OS_AXIS: 125
OS_SPHERE: +0.50
OS_ADD: +2.25
OD_ADD: +2.25
OD_SPHERE: +0.25
OS_CYLINDER: -0.25
OD_SPHERE: +2.50
OS_OVR_VA: 20/
OD_CYLINDER: SPH

## 2022-07-27 ASSESSMENT — REFRACTION_MANIFEST
OD_CYLINDER: -0.25
OS_VA1: 20/25-2
OD_ADD: +2.25
OS_CYLINDER: -0.50
OD_SPHERE: +0.75
OS_SPHERE: +0.75
OD_VA1: 20/25-2
OS_VA2: 20/25-2
OS_AXIS: 090
OD_AXIS: 110
OD_VA2: 20/25-2
OS_ADD: +2.25

## 2022-07-27 ASSESSMENT — SPHEQUIV_DERIVED
OS_SPHEQUIV: 0.75
OD_SPHEQUIV: 0.125
OD_SPHEQUIV: 0.625
OS_SPHEQUIV: 0.5

## 2022-07-27 ASSESSMENT — MACULA - EPIRETINAL MEMBRANE (ERM)
OD_ERM: 1+
OS_ERM: 1+

## 2022-07-27 ASSESSMENT — VISUAL ACUITY
OD_BCVA: 20/25-1
OS_BCVA: 20/25+2

## 2022-07-27 ASSESSMENT — CONFRONTATIONAL VISUAL FIELD TEST (CVF)
OS_FINDINGS: FULL
OD_FINDINGS: FULL

## 2022-07-27 ASSESSMENT — TONOMETRY
OS_IOP_MMHG: 12
OD_IOP_MMHG: 16

## 2022-07-27 ASSESSMENT — SUPERFICIAL PUNCTATE KERATITIS (SPK): OS_SPK: 2+

## 2022-08-31 ENCOUNTER — RX ONLY (RX ONLY)
Age: 77
End: 2022-08-31

## 2022-08-31 ENCOUNTER — DOCTOR'S OFFICE (OUTPATIENT)
Dept: URBAN - NONMETROPOLITAN AREA CLINIC 1 | Facility: CLINIC | Age: 77
Setting detail: OPHTHALMOLOGY
End: 2022-08-31
Payer: COMMERCIAL

## 2022-08-31 DIAGNOSIS — H35.033: ICD-10-CM

## 2022-08-31 DIAGNOSIS — H35.373: ICD-10-CM

## 2022-08-31 DIAGNOSIS — Z96.1: ICD-10-CM

## 2022-08-31 DIAGNOSIS — H04.122: ICD-10-CM

## 2022-08-31 DIAGNOSIS — H04.121: ICD-10-CM

## 2022-08-31 PROBLEM — H40.1130: Status: ACTIVE | Noted: 2021-02-16

## 2022-08-31 PROCEDURE — 83861 MICROFLUID ANALY TEARS: CPT | Performed by: OPHTHALMOLOGY

## 2022-08-31 PROCEDURE — 99213 OFFICE O/P EST LOW 20 MIN: CPT | Performed by: OPHTHALMOLOGY

## 2022-08-31 ASSESSMENT — REFRACTION_CURRENTRX
OD_SPHERE: +0.25
OS_OVR_VA: 20/
OS_CYLINDER: -0.25
OD_SPHERE: +2.50
OD_CYLINDER: SPH
OS_ADD: +2.25
OS_SPHERE: +0.50
OD_ADD: +2.25
OD_OVR_VA: 20/
OS_SPHERE: +2.50
OD_OVR_VA: 20/
OS_AXIS: 125
OS_OVR_VA: 20/

## 2022-08-31 ASSESSMENT — REFRACTION_MANIFEST
OS_AXIS: 090
OD_AXIS: 110
OD_VA1: 20/25-2
OS_VA2: 20/25-2
OS_SPHERE: +0.75
OS_VA1: 20/25-2
OD_ADD: +2.25
OD_SPHERE: +0.75
OD_CYLINDER: -0.25
OS_CYLINDER: -0.50
OD_VA2: 20/25-2
OS_ADD: +2.25

## 2022-08-31 ASSESSMENT — DRY EYES - PHYSICIAN NOTES: OS_GENERALCOMMENTS: TRACE PEE

## 2022-08-31 ASSESSMENT — REFRACTION_AUTOREFRACTION
OD_AXIS: 020
OS_AXIS: 180
OS_CYLINDER: 0.00
OD_SPHERE: +0.50
OD_CYLINDER: -0.75
OS_SPHERE: +0.75

## 2022-08-31 ASSESSMENT — CONFRONTATIONAL VISUAL FIELD TEST (CVF)
OS_FINDINGS: FULL
OD_FINDINGS: FULL

## 2022-08-31 ASSESSMENT — SPHEQUIV_DERIVED
OD_SPHEQUIV: 0.125
OS_SPHEQUIV: 0.5
OS_SPHEQUIV: 0.75
OD_SPHEQUIV: 0.625

## 2022-08-31 ASSESSMENT — VISUAL ACUITY
OS_BCVA: 20/30-2
OD_BCVA: 20/25-1

## 2022-08-31 ASSESSMENT — SUPERFICIAL PUNCTATE KERATITIS (SPK): OS_SPK: 2+

## 2023-03-08 ENCOUNTER — DOCTOR'S OFFICE (OUTPATIENT)
Dept: URBAN - NONMETROPOLITAN AREA CLINIC 1 | Facility: CLINIC | Age: 78
Setting detail: OPHTHALMOLOGY
End: 2023-03-08
Payer: COMMERCIAL

## 2023-03-08 DIAGNOSIS — Z96.1: ICD-10-CM

## 2023-03-08 DIAGNOSIS — H04.121: ICD-10-CM

## 2023-03-08 DIAGNOSIS — H04.122: ICD-10-CM

## 2023-03-08 DIAGNOSIS — H35.033: ICD-10-CM

## 2023-03-08 DIAGNOSIS — H35.373: ICD-10-CM

## 2023-03-08 PROCEDURE — 92134 CPTRZ OPH DX IMG PST SGM RTA: CPT | Performed by: OPHTHALMOLOGY

## 2023-03-08 PROCEDURE — 92012 INTRM OPH EXAM EST PATIENT: CPT | Performed by: OPHTHALMOLOGY

## 2023-03-08 ASSESSMENT — MACULA - EPIRETINAL MEMBRANE (ERM)
OS_ERM: 1+
OD_ERM: 1+

## 2023-03-08 ASSESSMENT — REFRACTION_AUTOREFRACTION
OS_CYLINDER: 0.00
OS_SPHERE: +0.75
OD_CYLINDER: -0.75
OD_AXIS: 020
OD_SPHERE: +0.50
OS_AXIS: 180

## 2023-03-08 ASSESSMENT — REFRACTION_MANIFEST
OD_VA1: 20/25-2
OS_CYLINDER: -0.50
OD_ADD: +2.25
OS_VA1: 20/25-2
OS_VA2: 20/25-2
OS_SPHERE: +0.75
OS_AXIS: 090
OD_VA2: 20/25-2
OD_AXIS: 110
OS_ADD: +2.25
OD_SPHERE: +0.75
OD_CYLINDER: -0.25

## 2023-03-08 ASSESSMENT — SPHEQUIV_DERIVED
OS_SPHEQUIV: 0.5
OS_SPHEQUIV: 0.75
OD_SPHEQUIV: 0.125
OD_SPHEQUIV: 0.625

## 2023-03-08 ASSESSMENT — DRY EYES - PHYSICIAN NOTES
OS_GENERALCOMMENTS: MILD K SICCA
OD_GENERALCOMMENTS: MILD K SICCA

## 2023-03-08 ASSESSMENT — PACHYMETRY
OS_CT_CORRECTION: -1
OD_CT_UM: 553
OD_CT_CORRECTION: -1
OS_CT_UM: 555

## 2023-03-08 ASSESSMENT — VISUAL ACUITY
OD_BCVA: 20/25-1
OS_BCVA: 20/25-2

## 2023-03-08 ASSESSMENT — REFRACTION_CURRENTRX
OS_AXIS: 125
OS_OVR_VA: 20/
OS_OVR_VA: 20/
OD_OVR_VA: 20/
OD_SPHERE: +0.25
OD_SPHERE: +2.50
OS_CYLINDER: -0.25
OD_OVR_VA: 20/
OS_SPHERE: +0.50
OD_ADD: +2.25
OD_CYLINDER: SPH
OS_ADD: +2.25
OS_SPHERE: +2.50

## 2023-03-08 ASSESSMENT — CONFRONTATIONAL VISUAL FIELD TEST (CVF)
OS_FINDINGS: FULL
OD_FINDINGS: FULL

## 2023-03-08 ASSESSMENT — TONOMETRY
OD_IOP_MMHG: 17
OS_IOP_MMHG: 17

## 2023-08-03 ENCOUNTER — DOCTOR'S OFFICE (OUTPATIENT)
Dept: URBAN - NONMETROPOLITAN AREA CLINIC 1 | Facility: CLINIC | Age: 78
Setting detail: OPHTHALMOLOGY
End: 2023-08-03
Payer: COMMERCIAL

## 2023-08-03 ENCOUNTER — RX ONLY (RX ONLY)
Age: 78
End: 2023-08-03

## 2023-08-03 DIAGNOSIS — S05.02XA: ICD-10-CM

## 2023-08-03 PROBLEM — H35.372 EPI-RETINAL MENBRANE; RIGHT EYE, LEFT EYE: Status: ACTIVE | Noted: 2023-08-03

## 2023-08-03 PROBLEM — H35.371 EPI-RETINAL MENBRANE; RIGHT EYE, LEFT EYE: Status: ACTIVE | Noted: 2023-08-03

## 2023-08-03 PROCEDURE — 99212 OFFICE O/P EST SF 10 MIN: CPT | Performed by: OPHTHALMOLOGY

## 2023-08-03 ASSESSMENT — REFRACTION_MANIFEST
OS_AXIS: 090
OS_SPHERE: +0.75
OS_ADD: +2.25
OD_SPHERE: +0.75
OD_VA1: 20/25-2
OD_AXIS: 110
OS_VA1: 20/25-2
OD_ADD: +2.25
OS_CYLINDER: -0.50
OD_CYLINDER: -0.25
OS_VA2: 20/25-2
OD_VA2: 20/25-2

## 2023-08-03 ASSESSMENT — VISUAL ACUITY
OD_BCVA: 20/25
OS_BCVA: 20/25-1

## 2023-08-03 ASSESSMENT — REFRACTION_AUTOREFRACTION
OS_AXIS: 129
OS_CYLINDER: -1.00
OS_SPHERE: +1.00
OD_AXIS: 092
OD_CYLINDER: -1.25
OD_SPHERE: +1.75

## 2023-08-03 ASSESSMENT — CONFRONTATIONAL VISUAL FIELD TEST (CVF)
OD_FINDINGS: FULL
OS_FINDINGS: FULL

## 2023-08-03 ASSESSMENT — REFRACTION_CURRENTRX
OD_OVR_VA: 20/
OD_SPHERE: +2.50
OS_ADD: +2.25
OD_SPHERE: +0.25
OS_OVR_VA: 20/
OD_ADD: +2.25
OS_SPHERE: +2.50
OS_CYLINDER: -0.25
OS_OVR_VA: 20/
OS_SPHERE: +0.50
OD_OVR_VA: 20/
OS_AXIS: 125
OD_CYLINDER: SPH

## 2023-08-03 ASSESSMENT — DRY EYES - PHYSICIAN NOTES
OD_GENERALCOMMENTS: MILD K SICCA
OS_GENERALCOMMENTS: MILD K SICCA

## 2023-08-03 ASSESSMENT — MACULA - EPIRETINAL MEMBRANE (ERM)
OD_ERM: 1+
OS_ERM: 1+

## 2023-08-03 ASSESSMENT — SPHEQUIV_DERIVED
OD_SPHEQUIV: 1.125
OS_SPHEQUIV: 0.5
OS_SPHEQUIV: 0.5
OD_SPHEQUIV: 0.625

## 2024-03-08 ENCOUNTER — DOCTOR'S OFFICE (OUTPATIENT)
Dept: URBAN - NONMETROPOLITAN AREA CLINIC 1 | Facility: CLINIC | Age: 79
Setting detail: OPHTHALMOLOGY
End: 2024-03-08
Payer: MEDICARE

## 2024-03-08 VITALS — HEIGHT: 55 IN

## 2024-03-08 DIAGNOSIS — H35.033: ICD-10-CM

## 2024-03-08 DIAGNOSIS — H40.1130: ICD-10-CM

## 2024-03-08 DIAGNOSIS — H35.371: ICD-10-CM

## 2024-03-08 DIAGNOSIS — H35.372: ICD-10-CM

## 2024-03-08 PROCEDURE — 92250 FUNDUS PHOTOGRAPHY W/I&R: CPT | Performed by: OPHTHALMOLOGY

## 2024-03-08 PROCEDURE — 92083 EXTENDED VISUAL FIELD XM: CPT | Performed by: OPHTHALMOLOGY

## 2024-03-08 PROCEDURE — 99213 OFFICE O/P EST LOW 20 MIN: CPT | Performed by: OPHTHALMOLOGY

## 2024-03-08 PROCEDURE — 92134 CPTRZ OPH DX IMG PST SGM RTA: CPT | Performed by: OPHTHALMOLOGY

## 2024-03-08 PROCEDURE — 76514 ECHO EXAM OF EYE THICKNESS: CPT | Performed by: OPHTHALMOLOGY
